# Patient Record
Sex: FEMALE | Race: WHITE | NOT HISPANIC OR LATINO
[De-identification: names, ages, dates, MRNs, and addresses within clinical notes are randomized per-mention and may not be internally consistent; named-entity substitution may affect disease eponyms.]

---

## 2019-03-12 PROBLEM — Z00.00 ENCOUNTER FOR PREVENTIVE HEALTH EXAMINATION: Status: ACTIVE | Noted: 2019-03-12

## 2019-03-18 ENCOUNTER — APPOINTMENT (OUTPATIENT)
Dept: ORTHOPEDIC SURGERY | Facility: CLINIC | Age: 69
End: 2019-03-18
Payer: MEDICARE

## 2019-03-18 VITALS — HEIGHT: 62 IN | WEIGHT: 120 LBS | RESPIRATION RATE: 16 BRPM | BODY MASS INDEX: 22.08 KG/M2

## 2019-03-18 DIAGNOSIS — M65.4 RADIAL STYLOID TENOSYNOVITIS [DE QUERVAIN]: ICD-10-CM

## 2019-03-18 DIAGNOSIS — E07.9 DISORDER OF THYROID, UNSPECIFIED: ICD-10-CM

## 2019-03-18 DIAGNOSIS — Z87.898 PERSONAL HISTORY OF OTHER SPECIFIED CONDITIONS: ICD-10-CM

## 2019-03-18 DIAGNOSIS — K31.9 DISEASE OF STOMACH AND DUODENUM, UNSPECIFIED: ICD-10-CM

## 2019-03-18 DIAGNOSIS — Z87.39 PERSONAL HISTORY OF OTHER DISEASES OF THE MUSCULOSKELETAL SYSTEM AND CONNECTIVE TISSUE: ICD-10-CM

## 2019-03-18 DIAGNOSIS — Z78.9 OTHER SPECIFIED HEALTH STATUS: ICD-10-CM

## 2019-03-18 PROCEDURE — 73110 X-RAY EXAM OF WRIST: CPT | Mod: 50

## 2019-03-18 PROCEDURE — 20550 NJX 1 TENDON SHEATH/LIGAMENT: CPT | Mod: LT

## 2019-03-18 PROCEDURE — 99203 OFFICE O/P NEW LOW 30 MIN: CPT | Mod: 25

## 2019-03-18 RX ORDER — BACLOFEN 10 MG/1
10 TABLET ORAL
Qty: 90 | Refills: 0 | Status: ACTIVE | COMMUNITY
Start: 2018-10-23

## 2019-03-18 RX ORDER — QUETIAPINE 400 MG/1
TABLET, FILM COATED ORAL
Refills: 0 | Status: ACTIVE | COMMUNITY

## 2019-03-18 RX ORDER — BACLOFEN 20 MG/1
20 TABLET ORAL
Refills: 0 | Status: ACTIVE | COMMUNITY

## 2019-03-18 RX ORDER — QUETIAPINE FUMARATE 50 MG/1
50 TABLET ORAL
Qty: 90 | Refills: 0 | Status: ACTIVE | COMMUNITY
Start: 2018-06-19

## 2019-03-18 RX ORDER — RANITIDINE 150 MG/1
150 TABLET ORAL
Qty: 60 | Refills: 0 | Status: ACTIVE | COMMUNITY
Start: 2018-05-29

## 2019-03-18 RX ORDER — ATORVASTATIN CALCIUM 20 MG/1
20 TABLET, FILM COATED ORAL
Qty: 30 | Refills: 0 | Status: ACTIVE | COMMUNITY
Start: 2018-09-19

## 2019-03-18 RX ORDER — PREGABALIN 25 MG/1
25 CAPSULE ORAL
Qty: 30 | Refills: 0 | Status: ACTIVE | COMMUNITY
Start: 2018-10-23

## 2019-03-18 RX ORDER — DULOXETINE HYDROCHLORIDE 60 MG/1
60 CAPSULE, DELAYED RELEASE PELLETS ORAL
Qty: 60 | Refills: 0 | Status: ACTIVE | COMMUNITY
Start: 2018-09-20

## 2019-03-18 RX ORDER — DENOSUMAB 60 MG/ML
60 INJECTION SUBCUTANEOUS
Refills: 0 | Status: ACTIVE | COMMUNITY

## 2019-03-18 RX ORDER — CHLORDIAZEPOXIDE HYDROCHLORIDE 25 MG/1
25 CAPSULE ORAL
Qty: 60 | Refills: 0 | Status: ACTIVE | COMMUNITY
Start: 2018-10-18

## 2019-03-18 RX ORDER — PREGABALIN 100 MG/1
100 CAPSULE ORAL
Qty: 30 | Refills: 0 | Status: ACTIVE | COMMUNITY
Start: 2018-10-23

## 2019-03-18 RX ORDER — CHLORDIAZEPOXIDE HCL 100 MG
AMPUL (EA) INJECTION
Refills: 0 | Status: ACTIVE | COMMUNITY

## 2019-03-18 RX ORDER — DULOXETINE HYDROCHLORIDE 30 MG/1
30 CAPSULE, DELAYED RELEASE ORAL
Refills: 0 | Status: ACTIVE | COMMUNITY

## 2019-03-18 RX ORDER — POLYETHYLENE GLYCOL 3350 17 G/17G
17 POWDER, FOR SOLUTION ORAL
Qty: 60 | Refills: 0 | Status: ACTIVE | COMMUNITY
Start: 2018-10-11

## 2019-03-18 RX ORDER — BUTALBITAL, ACETAMINOPHEN AND CAFFEINE 325; 50; 40 MG/1; MG/1; MG/1
50-325-40 TABLET ORAL
Qty: 30 | Refills: 0 | Status: ACTIVE | COMMUNITY
Start: 2018-10-23

## 2024-02-29 ENCOUNTER — EMERGENCY (EMERGENCY)
Facility: HOSPITAL | Age: 74
LOS: 0 days | Discharge: HOME | End: 2024-03-01
Attending: STUDENT IN AN ORGANIZED HEALTH CARE EDUCATION/TRAINING PROGRAM
Payer: MEDICARE

## 2024-02-29 VITALS
OXYGEN SATURATION: 98 % | RESPIRATION RATE: 18 BRPM | SYSTOLIC BLOOD PRESSURE: 138 MMHG | TEMPERATURE: 98 F | HEART RATE: 75 BPM | DIASTOLIC BLOOD PRESSURE: 84 MMHG | WEIGHT: 139.99 LBS | HEIGHT: 63 IN

## 2024-02-29 DIAGNOSIS — S60.812A ABRASION OF LEFT WRIST, INITIAL ENCOUNTER: ICD-10-CM

## 2024-02-29 DIAGNOSIS — Z76.0 ENCOUNTER FOR ISSUE OF REPEAT PRESCRIPTION: ICD-10-CM

## 2024-02-29 DIAGNOSIS — Z91.52 PERSONAL HISTORY OF NONSUICIDAL SELF-HARM: ICD-10-CM
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
## 2024-02-29 DIAGNOSIS — F43.21 ADJUSTMENT DISORDER WITH DEPRESSED MOOD: ICD-10-CM

## 2024-02-29 DIAGNOSIS — F60.3 BORDERLINE PERSONALITY DISORDER: ICD-10-CM

## 2024-02-29 DIAGNOSIS — Z79.01 LONG TERM (CURRENT) USE OF ANTICOAGULANTS: ICD-10-CM

## 2024-02-29 DIAGNOSIS — Z91.040 LATEX ALLERGY STATUS: ICD-10-CM

## 2024-02-29 DIAGNOSIS — E78.5 HYPERLIPIDEMIA, UNSPECIFIED: ICD-10-CM

## 2024-02-29 DIAGNOSIS — Z86.718 PERSONAL HISTORY OF OTHER VENOUS THROMBOSIS AND EMBOLISM: ICD-10-CM

## 2024-02-29 DIAGNOSIS — I48.91 UNSPECIFIED ATRIAL FIBRILLATION: ICD-10-CM
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
## 2024-02-29 DIAGNOSIS — F41.9 ANXIETY DISORDER, UNSPECIFIED: ICD-10-CM

## 2024-02-29 DIAGNOSIS — Z88.1 ALLERGY STATUS TO OTHER ANTIBIOTIC AGENTS STATUS: ICD-10-CM

## 2024-02-29 DIAGNOSIS — Z91.51 PERSONAL HISTORY OF SUICIDAL BEHAVIOR: ICD-10-CM

## 2024-02-29 LAB
ALBUMIN SERPL ELPH-MCNC: 3.4 G/DL — SIGNIFICANT CHANGE UP (ref 3.3–5)
ALP SERPL-CCNC: 80 U/L — SIGNIFICANT CHANGE UP (ref 40–120)
ALT FLD-CCNC: 24 U/L — SIGNIFICANT CHANGE UP (ref 12–78)
AMPHET UR-MCNC: NEGATIVE — SIGNIFICANT CHANGE UP
ANION GAP SERPL CALC-SCNC: 5 MMOL/L — SIGNIFICANT CHANGE UP (ref 5–17)
APPEARANCE UR: CLEAR — SIGNIFICANT CHANGE UP
AST SERPL-CCNC: 37 U/L — SIGNIFICANT CHANGE UP (ref 15–37)
BACTERIA # UR AUTO: NEGATIVE /HPF — SIGNIFICANT CHANGE UP
BARBITURATES UR SCN-MCNC: NEGATIVE — SIGNIFICANT CHANGE UP
BASOPHILS # BLD AUTO: 0.06 K/UL — SIGNIFICANT CHANGE UP (ref 0–0.2)
BASOPHILS NFR BLD AUTO: 0.8 % — SIGNIFICANT CHANGE UP (ref 0–2)
BENZODIAZ UR-MCNC: NEGATIVE — SIGNIFICANT CHANGE UP
BILIRUB SERPL-MCNC: 0.4 MG/DL — SIGNIFICANT CHANGE UP (ref 0.2–1.2)
BILIRUB UR-MCNC: NEGATIVE — SIGNIFICANT CHANGE UP
BUN SERPL-MCNC: 17 MG/DL — SIGNIFICANT CHANGE UP (ref 7–23)
CALCIUM SERPL-MCNC: 9.2 MG/DL — SIGNIFICANT CHANGE UP (ref 8.5–10.1)
CHLORIDE SERPL-SCNC: 109 MMOL/L — HIGH (ref 96–108)
CO2 SERPL-SCNC: 25 MMOL/L — SIGNIFICANT CHANGE UP (ref 22–31)
COCAINE METAB.OTHER UR-MCNC: NEGATIVE — SIGNIFICANT CHANGE UP
COLOR SPEC: YELLOW — SIGNIFICANT CHANGE UP
CREAT SERPL-MCNC: 0.94 MG/DL — SIGNIFICANT CHANGE UP (ref 0.5–1.3)
DIFF PNL FLD: NEGATIVE — SIGNIFICANT CHANGE UP
EGFR: 63 ML/MIN/1.73M2 — SIGNIFICANT CHANGE UP
EOSINOPHIL # BLD AUTO: 0.19 K/UL — SIGNIFICANT CHANGE UP (ref 0–0.5)
EOSINOPHIL NFR BLD AUTO: 2.6 % — SIGNIFICANT CHANGE UP (ref 0–6)
ETHANOL SERPL-MCNC: <10 MG/DL — SIGNIFICANT CHANGE UP (ref 0–10)
GLUCOSE SERPL-MCNC: 94 MG/DL — SIGNIFICANT CHANGE UP (ref 70–99)
GLUCOSE UR QL: NEGATIVE MG/DL — SIGNIFICANT CHANGE UP
HCT VFR BLD CALC: 43.5 % — SIGNIFICANT CHANGE UP (ref 34.5–45)
HGB BLD-MCNC: 14.3 G/DL — SIGNIFICANT CHANGE UP (ref 11.5–15.5)
IMM GRANULOCYTES NFR BLD AUTO: 0.1 % — SIGNIFICANT CHANGE UP (ref 0–0.9)
KETONES UR-MCNC: NEGATIVE MG/DL — SIGNIFICANT CHANGE UP
LEUKOCYTE ESTERASE UR-ACNC: NEGATIVE — SIGNIFICANT CHANGE UP
LYMPHOCYTES # BLD AUTO: 2.3 K/UL — SIGNIFICANT CHANGE UP (ref 1–3.3)
LYMPHOCYTES # BLD AUTO: 31.9 % — SIGNIFICANT CHANGE UP (ref 13–44)
MCHC RBC-ENTMCNC: 29.7 PG — SIGNIFICANT CHANGE UP (ref 27–34)
MCHC RBC-ENTMCNC: 32.9 G/DL — SIGNIFICANT CHANGE UP (ref 32–36)
MCV RBC AUTO: 90.2 FL — SIGNIFICANT CHANGE UP (ref 80–100)
METHADONE UR-MCNC: NEGATIVE — SIGNIFICANT CHANGE UP
MONOCYTES # BLD AUTO: 0.65 K/UL — SIGNIFICANT CHANGE UP (ref 0–0.9)
MONOCYTES NFR BLD AUTO: 9 % — SIGNIFICANT CHANGE UP (ref 2–14)
NEUTROPHILS # BLD AUTO: 4 K/UL — SIGNIFICANT CHANGE UP (ref 1.8–7.4)
NEUTROPHILS NFR BLD AUTO: 55.6 % — SIGNIFICANT CHANGE UP (ref 43–77)
NITRITE UR-MCNC: NEGATIVE — SIGNIFICANT CHANGE UP
NRBC # BLD: 0 /100 WBCS — SIGNIFICANT CHANGE UP (ref 0–0)
OPIATES UR-MCNC: NEGATIVE — SIGNIFICANT CHANGE UP
PCP SPEC-MCNC: SIGNIFICANT CHANGE UP
PCP UR-MCNC: NEGATIVE — SIGNIFICANT CHANGE UP
PH UR: 7.5 — SIGNIFICANT CHANGE UP (ref 5–8)
PLATELET # BLD AUTO: 296 K/UL — SIGNIFICANT CHANGE UP (ref 150–400)
POTASSIUM SERPL-MCNC: 5.3 MMOL/L — SIGNIFICANT CHANGE UP (ref 3.5–5.3)
POTASSIUM SERPL-SCNC: 5.3 MMOL/L — SIGNIFICANT CHANGE UP (ref 3.5–5.3)
PROT SERPL-MCNC: 8.1 GM/DL — SIGNIFICANT CHANGE UP (ref 6–8.3)
PROT UR-MCNC: NEGATIVE MG/DL — SIGNIFICANT CHANGE UP
RBC # BLD: 4.82 M/UL — SIGNIFICANT CHANGE UP (ref 3.8–5.2)
RBC # FLD: 13.8 % — SIGNIFICANT CHANGE UP (ref 10.3–14.5)
RBC CASTS # UR COMP ASSIST: 0 /HPF — SIGNIFICANT CHANGE UP (ref 0–4)
SODIUM SERPL-SCNC: 139 MMOL/L — SIGNIFICANT CHANGE UP (ref 135–145)
SP GR SPEC: 1.01 — SIGNIFICANT CHANGE UP (ref 1–1.03)
THC UR QL: NEGATIVE — SIGNIFICANT CHANGE UP
TSH SERPL-MCNC: 2.56 UIU/ML — SIGNIFICANT CHANGE UP (ref 0.36–3.74)
UROBILINOGEN FLD QL: 0.2 MG/DL — SIGNIFICANT CHANGE UP (ref 0.2–1)
WBC # BLD: 7.21 K/UL — SIGNIFICANT CHANGE UP (ref 3.8–10.5)
WBC # FLD AUTO: 7.21 K/UL — SIGNIFICANT CHANGE UP (ref 3.8–10.5)
WBC UR QL: 0 /HPF — SIGNIFICANT CHANGE UP (ref 0–5)

## 2024-02-29 PROCEDURE — 99285 EMERGENCY DEPT VISIT HI MDM: CPT

## 2024-02-29 RX ORDER — HALOPERIDOL DECANOATE 100 MG/ML
5 INJECTION INTRAMUSCULAR ONCE
Refills: 0 | Status: DISCONTINUED | OUTPATIENT
Start: 2024-02-29 | End: 2024-02-29

## 2024-02-29 RX ORDER — ACETAMINOPHEN 500 MG
650 TABLET ORAL ONCE
Refills: 0 | Status: COMPLETED | OUTPATIENT
Start: 2024-02-29 | End: 2024-02-29

## 2024-02-29 RX ORDER — CLONAZEPAM 1 MG
1 TABLET ORAL ONCE
Refills: 0 | Status: DISCONTINUED | OUTPATIENT
Start: 2024-02-29 | End: 2024-02-29

## 2024-02-29 RX ORDER — QUETIAPINE FUMARATE 200 MG/1
50 TABLET, FILM COATED ORAL ONCE
Refills: 0 | Status: COMPLETED | OUTPATIENT
Start: 2024-02-29 | End: 2024-02-29

## 2024-02-29 RX ORDER — HALOPERIDOL DECANOATE 100 MG/ML
2.5 INJECTION INTRAMUSCULAR ONCE
Refills: 0 | Status: DISCONTINUED | OUTPATIENT
Start: 2024-02-29 | End: 2024-02-29

## 2024-02-29 RX ADMIN — Medication 650 MILLIGRAM(S): at 22:50

## 2024-02-29 RX ADMIN — QUETIAPINE FUMARATE 50 MILLIGRAM(S): 200 TABLET, FILM COATED ORAL at 20:34

## 2024-02-29 RX ADMIN — Medication 650 MILLIGRAM(S): at 21:50

## 2024-02-29 RX ADMIN — Medication 1 MILLIGRAM(S): at 20:32

## 2024-02-29 RX ADMIN — Medication 1 MILLIGRAM(S): at 19:41

## 2024-02-29 NOTE — ED PROVIDER NOTE - OBJECTIVE STATEMENT
73yo F from [facility] ([place]) with PMH of depression severe with psychotic symptoms, DVT, Afib on eliquis, HLD, p re-DM presents to ED for eval of anxiety and self-cutting. Pt states she never wanted to die, denies SI but states she was 'overwhelmed' so she cut herself with glass on L wrist. Unable to elicit exactly what concerns, states she didn't like food, other people 'stressing me out'. Hx of similar cutting episodes she states. Denies CP, SOB, abd pain, NVDC, urinary sxs, substance use or SI/HI. Denies hearing/seeing anything. States she now had her concerns addressed by facility's SW and wants to go back.     Taking: lurasidone, seroquil, clonazepam, zoloft, mirtazapine.

## 2024-02-29 NOTE — ED PROVIDER NOTE - CLINICAL SUMMARY MEDICAL DECISION MAKING FREE TEXT BOX
Angel PGY3: 75yo F from [facility] ([place]) with PMH of depression severe with psychotic symptoms, DVT, Afib on eliquis, HLD, p re-DM presents to ED for eval of self-cutting after having episode of anxiety and 'unable to deal with things'. Shallow L wrist abrasion w/o bleeding. DEnies SI/HI. Chart notable for hx of depression with psychosis and pt states she has cut before as part of stress responses. Currently requesting to go back to her facility. No other complaints, denies adamantly SI/HI. Check labs for underlying derangement, tele psych consult and dispo per their recs. Constant obs while in ED for risk of fall/elopement/agitation.

## 2024-02-29 NOTE — ED PROVIDER NOTE - NSICDXPASTMEDICALHX_GEN_ALL_CORE_FT
PAST MEDICAL HISTORY:  Chronic atrial fibrillation     Deep vein thrombosis (DVT) of non-extremity vein     Geriatric psychosis     HLD (hyperlipidemia)     MDD (major depressive disorder)

## 2024-02-29 NOTE — ED ADULT NURSE NOTE - CHIEF COMPLAINT QUOTE
Pt presents to the ed via EMS from [facility] for self harm. Pt was is AAOX2 pt reports feeling depressed, not sleeping and hates the food at nursing home. Pt was noted to have linear abrasion on left forearm. HX: Depression, HLD  Pt denies SI/HI/ visual and auditory hallucination. Pt placed on enhanced supervision. Pt voided twice in triage.

## 2024-02-29 NOTE — ED ADULT NURSE NOTE - OBJECTIVE STATEMENT
Pt is a 73yo Female AAOx2 anxiety depression hld tonsillectomy BIBA for self harm at the nursing home today. Pt reports feeling overwhelmed with nursing home staff and taking a piece of glass and scratching her right arm. Pt endorses 8/10 left arm pain at this time. Pt denies any HI/SI/command hallucinations at this time. Pt updated on plan of care at this time. Pt is a 75yo Female AAOx2 anxiety depression hld tonsillectomy BIBA for self harm at the nursing home today. Pt reports feeling overwhelmed with nursing home staff and taking a piece of glass and scratching her right arm, states she tried to harm herself. upon assessment minimal bleeding noted upon left arm. Pt endorses 8/10 left arm pain at this time. Pt denies any HI/command hallucinations at this time. Pt updated on plan of care at this time. 1:1 initiated.

## 2024-02-29 NOTE — ED ADULT NURSE NOTE - NSFALLUNIVINTERV_ED_ALL_ED
Bed/Stretcher in lowest position, wheels locked, appropriate side rails in place/Call bell, personal items and telephone in reach/Instruct patient to call for assistance before getting out of bed/chair/stretcher/Non-slip footwear applied when patient is off stretcher/[place] to call system/Physically safe environment - no spills, clutter or unnecessary equipment/Purposeful proactive rounding/Room/bathroom lighting operational, light cord in reach

## 2024-02-29 NOTE — ED ADULT TRIAGE NOTE - CHIEF COMPLAINT QUOTE
Pt presents to the ed via EMS from [facility] for self harm. Pt was is AAOX2 pt reports feeling depressed, not sleeping and hates the food at nursing home. Pt was noted to have linear abrasion on left forearm. HX: Depression, HLD  Pt denies SI/HI/ visual and auditory hallunications. Pt placed on enhanced supervision. Pt presents to the ed via EMS from [facility] for self harm. Pt was is AAOX2 pt reports feeling depressed, not sleeping and hates the food at nursing home. Pt was noted to have linear abrasion on left forearm. HX: Depression, HLD  Pt denies SI/HI/ visual and auditory hallucination. Pt placed on enhanced supervision. Pt voided twice in triage.

## 2024-02-29 NOTE — ED ADULT NURSE NOTE - ED STAT RN HANDOFF DETAILS
Report endorsed to oncoming RNs Siva and Alpa. 1:1 watch continues. Safety checks completed this shift. IV sites checked and remains WDL. Meds given as ordered with no s/s of adverse reactions. Fall and skin precs in place. Any issues endorsed to oncoming RN for follow up.

## 2024-02-29 NOTE — ED ADULT NURSE NOTE - CAS EDN DISCHARGE ASSESSMENT
Xolair Counseling:  Patient informed of potential adverse effects including but not limited to fever, muscle aches, rash and allergic reactions.  The patient verbalized understanding of the proper use and possible adverse effects of Xolair.  All of the patient's questions and concerns were addressed. Patient baseline mental status/Awake

## 2024-02-29 NOTE — ED PROVIDER NOTE - ATTENDING CONTRIBUTION TO CARE
75 y/o F with PMH depression w/ psychotic symptoms, DVT, afib on eliquis, HLD, DM presenting to the ED for psych eval. Patient was sent to the ED because she cut her L forearm with a piece of glass PTA. Reports she felt "frustrated" because she does not like the NH. Reports cutting behavior in the past. Denies SI/HI.  In the ED, vitals stable.  Patient is tearful with labile behavior. At times sitting on the floor and refusing to stand up.  States she dislikes the NH but also wants to go back.  Will obtain labs and medically clear patient  Plan for psych consult and likely discharge back to NH

## 2024-02-29 NOTE — ED PROVIDER NOTE - PHYSICAL EXAMINATION
CONSTITUTIONAL: elderly F intermittently agitated and demanding  SKIN: linear shallow abrasion over L wrist w/o active bleeding   HEAD: NCAT  EYES: NL inspection  ENT: MMM  NECK: Supple  CARD: RRR  RESP: CTAB  ABD: S/NT no R/G  EXT: no pedal edema  NEURO: Grossly unremarkable, ambulatory in eD  PSYCH: Cooperative after food, denies adamantly SI/HI but intermittent screaming/tearful demanding things to happen 'right now' but redirectable, doesn't appear withdrawn or responding to external stimuli

## 2024-02-29 NOTE — ED ADULT NURSE NOTE - ED STAT RN HANDOFF DETAILS 2
Report given to DAVID Hutchinson for my break coverage; plan of care, pending labs / rads, and issues brought up by pt endorsed to covering RN.

## 2024-02-29 NOTE — ED PROVIDER NOTE - PROGRESS NOTE DETAILS
TP called, pt cleared to go back to the nursing home pt signed out to me from dr segura, pt sent from [facility] for self cutting, pending re assessment from psychiatry this morning

## 2024-02-29 NOTE — ED PROVIDER NOTE - PATIENT PORTAL LINK FT
You can access the FollowMyHealth Patient Portal offered by [facility] by registering at the following website: [email or web address]. By joining [facility]’s FollowMyHealth portal, you will also be able to view your health information using other applications (apps) compatible with our system.

## 2024-02-29 NOTE — ED PROVIDER NOTE - NS ED ATTENDING STATEMENT MOD
I have seen and examined this patient and fully participated in the care of this patient as the teaching attending.  The service was shared with the SAI.  I reviewed and verified the documentation.

## 2024-03-01 VITALS
TEMPERATURE: 98 F | DIASTOLIC BLOOD PRESSURE: 77 MMHG | OXYGEN SATURATION: 97 % | HEART RATE: 79 BPM | RESPIRATION RATE: 19 BRPM | SYSTOLIC BLOOD PRESSURE: 115 MMHG

## 2024-03-01 DIAGNOSIS — F43.21 ADJUSTMENT DISORDER WITH DEPRESSED MOOD: ICD-10-CM

## 2024-03-01 PROCEDURE — 90792 PSYCH DIAG EVAL W/MED SRVCS: CPT | Mod: 2W

## 2024-03-01 RX ORDER — DOCUSATE SODIUM 100 MG
1 CAPSULE ORAL
Qty: 60 | Refills: 0
Start: 2024-03-01 | End: 2024-03-30

## 2024-03-01 NOTE — ED BEHAVIORAL HEALTH ASSESSMENT NOTE - DETAILS
loss of brother (he  at age 16) BTCM d/w NH staff d/w ED provider pt reports hx of NSSIB by cutting in the past, also possible hx of cutting with suicidal intent (denies to writer but told other provider self-aborted SA by cutting), also reports hx of suicidal gesture of putting cord around neck months ago

## 2024-03-01 NOTE — ED ADULT NURSE REASSESSMENT NOTE - DESCRIPTION
Pt appears comfortable, sleeping on stretcher. A&Ox3, arousable. Pt states feels tired, does not endorse SI / HI during my assessment. Complaining of pain to rt big toe, toe appears inflamed, red and tender to touch. Constant observation in progress, 1:1 sitter within arms reach, need for CO reassessed. Pt appears comfortable, sleeping on stretcher. A&Ox3, arousable. Pt states feels tired, does not endorse SI / HI during my assessment. Complaining of pain to rt big toe, toe appears inflamed, red and tender to touch. Left forearm superficial wound without bleeding, no dressing, denies pain/tenderness to site. Constant observation in progress, 1:1 sitter within arms reach, need for CO reassessed.

## 2024-03-01 NOTE — ED BEHAVIORAL HEALTH ASSESSMENT NOTE - RISK ASSESSMENT
risk factors: hx of SIB/SA vs gestures, prior admissions, psych dx    protective factors: denies substance use, denies SI/HI, domiciled in supervised setting, denies access to guns/weapons

## 2024-03-01 NOTE — ED BEHAVIORAL HEALTH ASSESSMENT NOTE - CURRENT MEDICATION
Seroquel 50mg 3 x day     Zoloft 150mg 1x daily     Mirtazapine 45mg 1x day     Clonazepam 1mg Seroquel 50mg 3x day   Zoloft 150mg 1x daily   Mirtazapine 45mg 1x day   Clonazepam 1mg prn Seroquel 50mg 3x day   Zoloft 150mg 1x daily   Mirtazapine 45mg 1x day   Clonazepam 1mg prn  Lurasidone 40mg 1x day

## 2024-03-01 NOTE — ED BEHAVIORAL HEALTH ASSESSMENT NOTE - SUMMARY
75 yo F; single; non-caregiver; domiciled in NH; PPHx of depression, borderline personality disorder, prior admissions, reported hx of ECT, hx of NSSIB/SA vs gestures; denies substance use; BIB EMS activated by NH; psychiatry consulted for SIB.  Pt currently calm/cooperative, admits to depressive symptoms related to circumstances (limited family involvement, dislike of NH) but denies current SI/HI.  Pt has reported hx of suicidal gestures in the context of poor frustration tolerance.  She is not interested in voluntary admission at this time.  She does not appear acutely depressed, psychotic, manic, anxious, agitated, or intoxicated.  Staff at NH do not feel comfortable having pt return overnight, suggest coordinating with day staff to ensure appropriate staffing for 1:1 there.  Pt to hold for reassessment to ensure stability of pt's mood/behavior overnight and obtain additional collateral from outpatient providers if possible.  Anticipate likely discharge in the morning and recommend coordinating enhanced 1:1 observation while at NH (already has 1:1 during 7am-11pm but not overnight).

## 2024-03-01 NOTE — ED ADULT NURSE REASSESSMENT NOTE - NSFALLRISKINTERV_ED_ALL_ED

## 2024-03-01 NOTE — ED BEHAVIORAL HEALTH PROGRESS NOTE - DETAILS:
Patient this morning is calm, awake/alert, very conversant and pleasant. She is in no emotional distress. She reports that she wants to return to her nursing home. She denies any wishes to harm herself. Reports that yesterday she was "overwhelmed" about a new art project she is wanting to do and some financial stress, so that is why she ended up making the cut on herself ("it was a scratch, not a cut, there was no blood"). Patient talks about chronic loneliness, dissatisfaction with the food in her NH, and how she has always been a sad person her whole life. She describes some increase in loneliness recently because her sister has not been in touch as much, and her nieces/nephews were her biggest sources of eli. Patient has no acute safety concerns and wishes to return home. She understands the ER is available to her in the future if she experiences a crisis.

## 2024-03-01 NOTE — ED BEHAVIORAL HEALTH PROGRESS NOTE - CASE SUMMARY/FORMULATION (CLEARLY DOCUMENT RATIONALE FOR DISPOSITION CHANGE)
Patient is calm, in no distress, and requesting discharge. She clarifies she was momentarily overwhelmed and therefore "scratched" herself but denies any intention to harm herself. Pt does not require psychiatric hospitalization. She contracts to safety, states she will refer self back to ER if needed in the future.

## 2024-03-01 NOTE — ED BEHAVIORAL HEALTH ASSESSMENT NOTE - DESCRIPTION
SW spoke to covering RN - per chart: hx DVT, afib, HLD Per ED provider, pt was somewhat labile/crying in ED but denies SI and wanted to leave. previously worked as a nurse

## 2024-03-01 NOTE — ED BEHAVIORAL HEALTH PROGRESS NOTE - RISK ASSESSMENT
low acute risk - denying SI, requesting discharge, help-seeking and would return to ED if feeling unsafe

## 2024-03-01 NOTE — ED ADULT NURSE REASSESSMENT NOTE - NS ED NURSE REASSESS COMMENT FT1
Pt remains asleep and calm at this time. Respirations are even and unlabored on room air. 1:1 watch continues and tech remains at the bedside and is assessed for safety. Safety and fall precautions are in place.
Pt resting on stretcher. Safety maintained. Updates given. Monitoring continued. Constant observation in progress, 1:1 sitter within arms reach, need for CO reassessed. IV site & patency inspected and integrity maintained. observer gave patient hygiene supplies, per observer, pt independently washed up, brushed teeth; fresh pull ups provided to pt. warm Meal offered and provided, tolerated.  Pt expresses desire to go back to nursing home; wait time explained.
Pt resting on stretcher. Safety maintained. Updates given. Monitoring continued. Universal fall precautions in place, side rails x2 raised,  socks provided, personal belongings within reach, oriented to call bell system. Constant observation in progress, 1:1 sitter within arms reach, need for CO reassessed. Lunch tray provided, tolerated. Pt appears to be more interactive, smiling, being playful with staff. Wait time for transport explained. No other complaints at this time
Received report from RNs Roberto Carlos and Eugenio. Identified pt and introduced myself as RN. Pt is anxious and restless. Respirations are even and unlabored on room air. 1:1 watch continues, and tech assessed for safety. Safety and fall precautions in place.
Unable to reach [facility]. Pt pending transport for d/c. iv d/c cath intact. pt verbalized feeling better, observed having lunch. No belongings returned by security verified.
pt asked for Shinto services / rabbi.
pt more awake now, behaving anxiously. called RN over to express frustration about wait time. Asked to have gown changed, gown was changed per request. Asked to have pull up changed as pt felt it was soiled and has not been changed in a while, explained to pt we don't have pull ups, only diapers,pt refused diapers. Pt upset because she felt "ignored... nobody asks me how I'm feeling" pt also states she wants to go back to [facility].. Constant observation in progress, 1:1 sitter within arms reach, observer witnessed asking pt what she needs. pt noted to ask only for primary RN and reluctant to speak to other staff. Bfast tray offered while pt was asleep, pt was upset because the food had gone cold; RN instructed tech to heat up food; and given back to pt.
Pt return to Nursing home via stretcher with 2 [employer] attendants.
Handoff report received from nurse Sanderson for shift change. Plan of care reviewed. Pt received resting on stretcher. Constant observation in progress, 1:1 sitter within arms reach, need for CO reassessed. IV site & patency inspected and integrity maintained. Waiting telepsych reassessment.

## 2025-02-13 NOTE — ED BEHAVIORAL HEALTH ASSESSMENT NOTE - NSBHMSEBEHAV_PSY_A_CORE
02/13/25 1315   Appointment Info   Signing Clinician's Name / Credentials (OT) Amanda Levine, MANNY/L, CLT   Rehab Comments (OT) OT eval   Living Environment   People in Home facility resident   Current Living Arrangements other (see comments)  (Long term care resident)   Home Accessibility no concerns   Self-Care   Usual Activity Tolerance moderate   Current Activity Tolerance poor   Equipment Currently Used at Home wheelchair, manual;commode chair;grab bar, toilet;grab bar, tub/shower;shower chair   Activity/Exercise/Self-Care Comment Pt is typically independent with some ADL at home such as grooming and bathing self, commode transfers and all transfers.Pt receives assist with dressing and changing of brief.   General Information   Onset of Illness/Injury or Date of Surgery 02/11/25   Referring Physician Dr. Clover Dorman   Patient/Family Therapy Goal Statement (OT) open to sitting EOB now   Additional Occupational Profile Info/Pertinent History of Current Problem 77 year old female admitted on 2/11/2025. She has a past medical history significant for type 2 diabetes, CKD stage III, chronic pain syndrome, paroxysmal A-fib, chronic right-sided heart failure, peripheral arterial disease, and BERLIN who had a recent guillotine below-knee amputation complicated by poor wound healing admitted for right above-knee amputation.   Limitations/Impairments safety/cognitive   Right Lower Extremity (Weight-bearing Status) non weight-bearing (NWB)   Cognitive Status Examination   Affect/Mental Status (Cognitive) other (see comments)  (painful and crying)   Visual Perception   Visual Impairment/Limitations WFL   Sensory   Sensory Quick Adds sensation intact   Pain Assessment   Patient Currently in Pain Yes, see Vital Sign flowsheet  (RN to enter room)   Posture   Posture not impaired   Range of Motion Comprehensive   General Range of Motion no range of motion deficits identified   Strength Comprehensive (MMT)   Comment,  General Manual Muscle Testing (MMT) Assessment generalized weakness   Muscle Tone Assessment   Muscle Tone Quick Adds No deficits were identified   Coordination   Upper Extremity Coordination No deficits were identified   Bed Mobility   Bed Mobility rolling left;rolling right;supine-sit;sit-supine   Rolling Left [place] (Bed Mobility) minimum assist (75% patient effort)   Rolling Right [place] (Bed Mobility) dependent (less than 25% patient effort)   Supine-Sit [place] (Bed Mobility) maximum assist (25% patient effort)   Sit-Supine [place] (Bed Mobility) maximum assist (25% patient effort)   Transfers   Transfer Comments N/T   Balance   Balance Comments decreased   Activities of Daily Living   BADL Assessment/Intervention grooming   Grooming Assessment/Training   [place] Level (Grooming) supervision   Clinical Impression   Criteria for Skilled Therapeutic Interventions Met (OT) Yes, treatment indicated   OT Diagnosis decreased ADL independence/safety.   Influenced by the following impairments above knee amputation of R LE   OT Problem List-Impairments impacting ADL cognition;activity tolerance impaired;balance;mobility;strength;pain;post-surgical precautions;flexibility   Assessment of Occupational Performance 5 or more Performance Deficits   Identified Performance Deficits dressing, toileting, bathing, functional mobility, bed mobility and cog with all   Planned Therapy Interventions (OT) ADL retraining;balance training;bed mobility training;cognition;strengthening;transfer training;progressive activity/exercise;neuromuscular re-education   Clinical Decision Making Complexity (OT) comprehensive assessment/high complexity   Risk & Benefits of therapy have been explained care plan/treatment goals reviewed;patient   OT Total Evaluation Time   OT Mayra, Moderate Complexity Minutes (17391) 10   OT Goals   Therapy Frequency (OT) 4 times/week   OT Predicted Duration/Target Date for Goal Attainment  02/19/25   OT Goals Hygiene/Grooming;Bed Mobility   OT: Hygiene/Grooming modified independent   OT: Bed Mobility Modified independent;rolling;supine to/from sitting   Interventions   Interventions Quick Adds Self-Care/Home Management   Self-Care/Home Management   Self-Care/Home Mgmt/ADL, Compensatory, Meal Prep Minutes (72912) 25   Symptoms Noted During/After Treatment (Meal Preparation/Planning Training) increased pain   Treatment Detail/Skilled Intervention Pt in incr pain with all mvmt. RN aware. Pt attempted sitting EOB toward L side, but unable to complete despite HOB elevated/rail/cues for PLB. Pt rolled to L side with SBA/rail/cues for tech for changing bedding and rolled to R side with modA. supine scoot with maxAx2. Washed face and brushed hair with SBA after set-up. Call light in reach and dtr in room.   OT Discharge Planning   OT Plan any bathing/g/h;sitting EOB;progress to tx to w/c   OT Discharge Recommendation (DC Rec) (S)  Long term care facility;home with home care occupational therapy   OT Rationale for DC Rec Pt appears to have assist with ADL at home and can benefit from OT to incr ADL indep/safety at home.   OT Brief overview of current status MaxAx2 for supine scoot.   OT Total Distance Amb During Session (feet) 0   Total Session Time   Timed Code Treatment Minutes 25   Total Session Time (sum of timed and untimed services) 35      Cooperative